# Patient Record
Sex: MALE | Race: WHITE
[De-identification: names, ages, dates, MRNs, and addresses within clinical notes are randomized per-mention and may not be internally consistent; named-entity substitution may affect disease eponyms.]

---

## 2018-02-16 ENCOUNTER — HOSPITAL ENCOUNTER (EMERGENCY)
Dept: HOSPITAL 92 - SCSER | Age: 83
Discharge: HOME | End: 2018-02-16
Payer: MEDICARE

## 2018-02-16 DIAGNOSIS — I25.2: ICD-10-CM

## 2018-02-16 DIAGNOSIS — H61.22: Primary | ICD-10-CM

## 2018-02-16 DIAGNOSIS — I10: ICD-10-CM

## 2018-02-16 DIAGNOSIS — N40.0: ICD-10-CM

## 2018-02-16 DIAGNOSIS — E11.9: ICD-10-CM

## 2018-02-16 DIAGNOSIS — J32.9: ICD-10-CM

## 2018-02-16 DIAGNOSIS — J31.0: ICD-10-CM

## 2018-02-16 DIAGNOSIS — Z79.899: ICD-10-CM

## 2018-02-16 DIAGNOSIS — K21.9: ICD-10-CM

## 2018-02-16 DIAGNOSIS — Z79.84: ICD-10-CM

## 2018-02-16 PROCEDURE — 99282 EMERGENCY DEPT VISIT SF MDM: CPT

## 2018-04-06 ENCOUNTER — HOSPITAL ENCOUNTER (OUTPATIENT)
Dept: HOSPITAL 92 - CTENTCT | Age: 83
Discharge: HOME | End: 2018-04-06
Attending: OTOLARYNGOLOGY
Payer: MEDICARE

## 2018-04-06 DIAGNOSIS — J34.2: Primary | ICD-10-CM

## 2018-04-06 PROCEDURE — 70486 CT MAXILLOFACIAL W/O DYE: CPT

## 2018-04-26 ENCOUNTER — HOSPITAL ENCOUNTER (OUTPATIENT)
Dept: HOSPITAL 92 - SCSRAD | Age: 83
Discharge: HOME | End: 2018-04-26
Attending: FAMILY MEDICINE
Payer: MEDICARE

## 2018-04-26 DIAGNOSIS — N20.0: ICD-10-CM

## 2018-04-26 DIAGNOSIS — K59.00: Primary | ICD-10-CM

## 2018-04-26 PROCEDURE — 74018 RADEX ABDOMEN 1 VIEW: CPT

## 2018-04-26 NOTE — RAD
ABDOMEN ONE VIEW:

 

History: 

83-year-old male with constipation for weeks and dizzy spells.

 

FINDINGS: 

There is a stable 0.6 cm diameter left lower pole renal calculus, unchanged from the prior CT of 8-7-
17. Prominent sized left pelvic calcified phlebolith. Unremarkable abdominal gas pattern without evid
ence for bowel obstruction. No evidence for extraluminal gas or free intraperitoneal air. 

 

IMPRESSION: 

Left renal calculus. No evidence of bowel obstruction or other acute process. 

 

POS: OFF